# Patient Record
Sex: FEMALE | Race: WHITE | NOT HISPANIC OR LATINO
[De-identification: names, ages, dates, MRNs, and addresses within clinical notes are randomized per-mention and may not be internally consistent; named-entity substitution may affect disease eponyms.]

---

## 2021-02-08 ENCOUNTER — FORM ENCOUNTER (OUTPATIENT)
Age: 61
End: 2021-02-08

## 2021-02-09 ENCOUNTER — TRANSCRIPTION ENCOUNTER (OUTPATIENT)
Age: 61
End: 2021-02-09

## 2021-02-10 ENCOUNTER — APPOINTMENT (OUTPATIENT)
Dept: DISASTER EMERGENCY | Facility: HOSPITAL | Age: 61
End: 2021-02-10

## 2021-02-10 ENCOUNTER — OUTPATIENT (OUTPATIENT)
Dept: OUTPATIENT SERVICES | Facility: HOSPITAL | Age: 61
LOS: 1 days | End: 2021-02-10

## 2021-02-10 VITALS
OXYGEN SATURATION: 95 % | RESPIRATION RATE: 13 BRPM | TEMPERATURE: 102 F | HEART RATE: 85 BPM | SYSTOLIC BLOOD PRESSURE: 116 MMHG | DIASTOLIC BLOOD PRESSURE: 75 MMHG

## 2021-02-10 VITALS
DIASTOLIC BLOOD PRESSURE: 70 MMHG | TEMPERATURE: 101 F | RESPIRATION RATE: 15 BRPM | SYSTOLIC BLOOD PRESSURE: 131 MMHG | HEART RATE: 92 BPM | OXYGEN SATURATION: 96 %

## 2021-02-10 DIAGNOSIS — U07.1 COVID-19: ICD-10-CM

## 2021-02-10 PROBLEM — Z00.00 ENCOUNTER FOR PREVENTIVE HEALTH EXAMINATION: Status: ACTIVE | Noted: 2021-02-10

## 2021-02-10 RX ORDER — BAMLANIVIMAB 35 MG/ML
700 INJECTION, SOLUTION INTRAVENOUS ONCE
Refills: 0 | Status: DISCONTINUED | OUTPATIENT
Start: 2021-02-10 | End: 2021-02-25

## 2021-02-10 RX ORDER — SODIUM CHLORIDE 9 MG/ML
250 INJECTION INTRAMUSCULAR; INTRAVENOUS; SUBCUTANEOUS
Refills: 0 | Status: DISCONTINUED | OUTPATIENT
Start: 2021-02-10 | End: 2021-02-25

## 2021-02-10 NOTE — CHART NOTE - NSCHARTNOTEFT_GEN_A_CORE
CC: Monoclonal Antibody Infusion/COVID 19 Positive  60yFemale with pmhx of breast CA s/p b/l mastectomy 12/2020, on Letrozole, presents today for monoclonal antibody infusion. Patient endorses onset of symptoms 2/5 consisting of cough, HA, muscle aches & loss of taste, tested + for COVID 2/8, referred by PCP- Dr. Kingston for infusion.     exam/findings:  Vital Signs Last 24 Hrs  HR: 92 (10 Feb 2021 14:03) (92 - 92)  BP: 131/70 (10 Feb 2021 14:03) (131/70 - 131/70)  RR: 15 (10 Feb 2021 14:03) (15 - 15)  SpO2: 96% (10 Feb 2021 14:03) (96% - 96%)      PE:   Appearance: NAD		  Skin: warm and dry  Neurologic: Non-focal  Extremities: Normal range of motion,    ASSESSMENT:  Pt is a 60yFemale with pmhx of breast CA s/p b/l mastectomy 12/2020, on Letrozole, tested + for COVID 2/8, referred by PCP- Dr. Kingston to infusion center for Monoclonal antibody infusion (Bamlanivimab).  Symptoms/ Criteria:  Cough, HA, muscle aches & loss of taste  Risk Profile includes: Age >55, on immunosuppressive agent    PLAN:  - Infusion procedure explained to patient   - Consent for monoclonal antibody infusion obtained   - Risk & benefits discussed/all questions answered  - Infuse Bamlanivimab 700mg IV over one hour   - Observe patient for one hour post infusion    I have reviewed the Bamlanivimab Emergency Use Authorization (EUA) and I have provided the patient or patient's caregiver with the following information:      1. FDA has authorized emergency use Bamlanivimab, which is not an FDA-approved biological product.  2. The patient or patient's caregiver has the option to accept or refuse administration of Bamlanivimab.   3. The significant known and potential risks and benefits of Bamlanivimab and the extent to which such risks and benefits are unknown.  4. Information on available alternative treatments and risks and benefits of those alternatives.

## 2021-02-11 ENCOUNTER — TRANSCRIPTION ENCOUNTER (OUTPATIENT)
Age: 61
End: 2021-02-11

## 2021-02-12 ENCOUNTER — TRANSCRIPTION ENCOUNTER (OUTPATIENT)
Age: 61
End: 2021-02-12

## 2021-02-16 ENCOUNTER — TRANSCRIPTION ENCOUNTER (OUTPATIENT)
Age: 61
End: 2021-02-16

## 2024-03-04 ENCOUNTER — OFFICE (OUTPATIENT)
Dept: URBAN - METROPOLITAN AREA CLINIC 12 | Facility: CLINIC | Age: 64
Setting detail: OPHTHALMOLOGY
End: 2024-03-04
Payer: COMMERCIAL

## 2024-03-04 DIAGNOSIS — Z96.1: ICD-10-CM

## 2024-03-04 DIAGNOSIS — H50.89: ICD-10-CM

## 2024-03-04 DIAGNOSIS — H40.013: ICD-10-CM

## 2024-03-04 DIAGNOSIS — H16.223: ICD-10-CM

## 2024-03-04 DIAGNOSIS — H53.001: ICD-10-CM

## 2024-03-04 PROCEDURE — 92250 FUNDUS PHOTOGRAPHY W/I&R: CPT | Performed by: OPHTHALMOLOGY

## 2024-03-04 PROCEDURE — 92083 EXTENDED VISUAL FIELD XM: CPT | Performed by: OPHTHALMOLOGY

## 2024-03-04 PROCEDURE — 92014 COMPRE OPH EXAM EST PT 1/>: CPT | Performed by: OPHTHALMOLOGY

## 2024-03-04 ASSESSMENT — REFRACTION_MANIFEST
OS_VA1: 20/25-2
OS_SPHERE: +0.75
OU_VA: 20/30
OS_AXIS: 104
OD_ADD: +2.00
OD_AXIS: 045
OD_SPHERE: +0.75
OD_CYLINDER: -0.50
OS_ADD: +2.00
OS_CYLINDER: -1.50

## 2024-03-04 ASSESSMENT — REFRACTION_CURRENTRX
OS_AXIS: 090
OD_VPRISM_DIRECTION: SV
OD_CYLINDER: -0.50
OS_OVR_VA: 20/
OS_VPRISM_DIRECTION: SV
OD_OVR_VA: 20/
OD_SPHERE: +4.50
OD_AXIS: 033
OS_SPHERE: +3.00
OS_CYLINDER: -0.75

## 2024-03-04 ASSESSMENT — SPHEQUIV_DERIVED
OS_SPHEQUIV: 0
OD_SPHEQUIV: 0.5

## 2024-08-07 PROBLEM — Z12.11 SCREEN FOR COLON CANCER: Status: ACTIVE | Noted: 2024-08-07

## 2025-03-03 ENCOUNTER — OFFICE (OUTPATIENT)
Dept: URBAN - METROPOLITAN AREA CLINIC 12 | Facility: CLINIC | Age: 65
Setting detail: OPHTHALMOLOGY
End: 2025-03-03
Payer: COMMERCIAL

## 2025-03-03 DIAGNOSIS — H50.89: ICD-10-CM

## 2025-03-03 DIAGNOSIS — H53.001: ICD-10-CM

## 2025-03-03 DIAGNOSIS — Z96.1: ICD-10-CM

## 2025-03-03 DIAGNOSIS — H40.013: ICD-10-CM

## 2025-03-03 DIAGNOSIS — H16.223: ICD-10-CM

## 2025-03-03 PROCEDURE — 92014 COMPRE OPH EXAM EST PT 1/>: CPT | Performed by: OPHTHALMOLOGY

## 2025-03-03 PROCEDURE — 92083 EXTENDED VISUAL FIELD XM: CPT | Performed by: OPHTHALMOLOGY

## 2025-03-03 PROCEDURE — 92133 CPTRZD OPH DX IMG PST SGM ON: CPT | Performed by: OPHTHALMOLOGY

## 2025-03-03 ASSESSMENT — REFRACTION_CURRENTRX
OD_AXIS: 033
OS_VPRISM_DIRECTION: SV
OD_SPHERE: +4.50
OS_AXIS: 090
OS_CYLINDER: -0.75
OD_CYLINDER: -0.50
OD_OVR_VA: 20/
OS_SPHERE: +3.00
OS_OVR_VA: 20/
OD_VPRISM_DIRECTION: SV

## 2025-03-03 ASSESSMENT — REFRACTION_MANIFEST
OS_AXIS: 104
OS_CYLINDER: -1.25
OS_VA1: 20/30+2
OU_VA: 20/30
OD_AXIS: 075
OS_SPHERE: +0.75
OD_CYLINDER: -0.50
OD_ADD: +2.00
OD_SPHERE: +0.75
OS_ADD: +2.00
OD_CYLINDER: -0.50
OS_SPHERE: PLANO
OS_AXIS: 120
OD_SPHERE: +0.75
OD_VA1: 20/30-1
OS_VA1: 20/25-2
OD_AXIS: 045
OS_CYLINDER: -1.50

## 2025-03-03 ASSESSMENT — REFRACTION_AUTOREFRACTION
OS_AXIS: 118
OD_SPHERE: +0.75
OD_CYLINDER: -0.50
OD_AXIS: 076
OS_SPHERE: PLANO
OS_CYLINDER: -1.25

## 2025-03-03 ASSESSMENT — PACHYMETRY
OS_CT_CORRECTION: -3
OD_CT_UM: 597
OD_CT_CORRECTION: -4
OS_CT_UM: 583

## 2025-03-03 ASSESSMENT — KERATOMETRY
OD_K1POWER_DIOPTERS: 45.75
OS_K2POWER_DIOPTERS: 46.50
OS_K1POWER_DIOPTERS: 45.75
OS_AXISANGLE_DEGREES: 051
OD_AXISANGLE_DEGREES: 161
OD_K2POWER_DIOPTERS: 46.75

## 2025-03-03 ASSESSMENT — TONOMETRY
OS_IOP_MMHG: 15
OD_IOP_MMHG: 14

## 2025-03-03 ASSESSMENT — VISUAL ACUITY
OS_BCVA: 20/40-1
OD_BCVA: 20/40+1

## 2025-03-03 ASSESSMENT — SUPERFICIAL PUNCTATE KERATITIS (SPK)
OD_SPK: 1+
OS_SPK: 1+

## 2025-03-03 ASSESSMENT — CONFRONTATIONAL VISUAL FIELD TEST (CVF)
OD_FINDINGS: FULL
OS_FINDINGS: FULL

## 2025-06-26 ENCOUNTER — APPOINTMENT (OUTPATIENT)
Dept: PULMONOLOGY | Facility: CLINIC | Age: 65
End: 2025-06-26
Payer: COMMERCIAL

## 2025-06-26 PROCEDURE — 94729 DIFFUSING CAPACITY: CPT | Mod: TC

## 2025-06-26 PROCEDURE — 94727 GAS DIL/WSHOT DETER LNG VOL: CPT | Mod: TC

## 2025-06-26 PROCEDURE — 94060 EVALUATION OF WHEEZING: CPT | Mod: TC

## 2025-06-26 RX ORDER — ALBUTEROL SULFATE 2.5 MG/3ML
(2.5 MG/3ML) SOLUTION RESPIRATORY (INHALATION)
Qty: 0 | Refills: 0 | Status: COMPLETED | OUTPATIENT
Start: 2025-06-26